# Patient Record
(demographics unavailable — no encounter records)

---

## 2025-02-12 NOTE — PHYSICAL EXAM
[No Respiratory Distress] : no respiratory distress  [No Focal Deficits] : no focal deficits [Normal] : affect was normal and insight and judgment were intact

## 2025-02-12 NOTE — HISTORY OF PRESENT ILLNESS
[FreeTextEntry8] : Patient complains of right wrist pain. She has had intermittent pain in her wrist every few months for years. Usually she uses a wrist brace for one day and then this resolves. This started again 2 days ago and it has not yet resolved. Since this is the longest time this has persisted she would like evaluation.

## 2025-02-12 NOTE — PLAN
[FreeTextEntry1] : Will order X rays as above.  She will try OTC NSAIDs for the wrist pain.  Will decreased Lexapro as requested. If she feels her anxiety returning, consider adding Wellbutrin as well.

## 2025-02-12 NOTE — HEALTH RISK ASSESSMENT
[0] : 2) Feeling down, depressed, or hopeless: Not at all (0) [PHQ-2 Negative - No further assessment needed] : PHQ-2 Negative - No further assessment needed [Former] : Former [0-4] : 0-4 [> 15 Years] : > 15 Years [[id number]] : 0 Paramedian Forehead Flap Text: A decision was made to reconstruct the defect utilizing an interpolation axial flap and a staged reconstruction.  A telfa template was made of the defect.  This telfa template was then used to outline the paramedian forehead pedicle flap.  The donor area for the pedicle flap was then injected with anesthesia.  The flap was excised through the skin and subcutaneous tissue down to the layer of the underlying musculature.  The pedicle flap was carefully excised within this deep plane to maintain its blood supply.  The edges of the donor site were undermined.   The donor site was closed in a primary fashion.  The pedicle was then rotated into position and sutured.  Once the tube was sutured into place, adequate blood supply was confirmed with blanching and refill.  The pedicle was then wrapped with xeroform gauze and dressed appropriately with a telfa and gauze bandage to ensure continued blood supply and protect the attached pedicle.

## 2025-02-12 NOTE — ASSESSMENT
[FreeTextEntry1] : She has had intermittent right wrist pain for years but it has become more persistent. She would like an X ray to assess for arthritis.  She also wants to decrease her Lexapro from 10 to 5 mg. Her anxiety is well controlled but she has gained weight, about 25 pounds since starting Lexapro.

## 2025-05-13 NOTE — PLAN
[FreeTextEntry1] : Patient to follow up in 1 year for annual GYN exam discussed HRT, pt will consider  Mammogram and bilateral breast US due: per FS Colonoscopy due: 6/26 Dr. Murphy  Bone density due: 8/29 (5 yr f/u) Pap ordered Hemoccult ordered  All questions answered, patient agreeable with plan  I Eve Byrne [facility] am scribing for the presence of Dr. Arreaga the following sections HISTORY OF PRESENT ILLNESS, PAST MEDICAL/FAMILY/SOCIAL HISTORY; REVIEW OF SYSTEMS; VITAL SIGNS; PHYSICAL EXAM; DISPOSITION. I personally performed the services described in the documentation, reviewed the documentation recorded by the scribe in my presence and it accurately and completely records my words and actions.

## 2025-05-13 NOTE — HISTORY OF PRESENT ILLNESS
[FreeTextEntry1] : Patient is a 56 yo female here today for annual visit. She denies any GYN complaints at this time. on lexapro 5mg due to increased appetite on lexapro 10mg  LMP 2 years ago

## 2025-05-13 NOTE — PHYSICAL EXAM
[Appropriately responsive] : appropriately responsive [Alert] : alert [No Lymphadenopathy] : no lymphadenopathy [Soft] : soft [Non-tender] : non-tender [Oriented x3] : oriented x3 [Examination Of The Breasts] : a normal appearance [No Discharge] : no discharge [No Masses] : no breast masses were palpable [Labia Majora] : normal [Labia Minora] : normal [Normal] : normal [Uterine Adnexae] : normal [FreeTextEntry2] : Toribio FNP-BC [Occult Blood Positive] : was negative for occult blood analysis [FreeTextEntry5] : cervical fistula not seen on exam